# Patient Record
Sex: MALE | Race: WHITE | NOT HISPANIC OR LATINO | URBAN - METROPOLITAN AREA
[De-identification: names, ages, dates, MRNs, and addresses within clinical notes are randomized per-mention and may not be internally consistent; named-entity substitution may affect disease eponyms.]

---

## 2022-08-22 ENCOUNTER — EMERGENCY (EMERGENCY)
Facility: HOSPITAL | Age: 27
LOS: 1 days | Discharge: HOME | End: 2022-08-22
Attending: STUDENT IN AN ORGANIZED HEALTH CARE EDUCATION/TRAINING PROGRAM | Admitting: STUDENT IN AN ORGANIZED HEALTH CARE EDUCATION/TRAINING PROGRAM
Payer: COMMERCIAL

## 2022-08-22 VITALS
TEMPERATURE: 98 F | HEIGHT: 69 IN | HEART RATE: 77 BPM | RESPIRATION RATE: 17 BRPM | OXYGEN SATURATION: 97 % | SYSTOLIC BLOOD PRESSURE: 128 MMHG | WEIGHT: 184.97 LBS | DIASTOLIC BLOOD PRESSURE: 71 MMHG

## 2022-08-22 PROCEDURE — 99284 EMERGENCY DEPT VISIT MOD MDM: CPT | Mod: 25

## 2022-08-22 PROCEDURE — 93971 EXTREMITY STUDY: CPT

## 2022-08-22 PROCEDURE — 99284 EMERGENCY DEPT VISIT MOD MDM: CPT

## 2022-08-22 PROCEDURE — 93971 EXTREMITY STUDY: CPT | Mod: 26,LT

## 2022-08-22 NOTE — ED PROVIDER NOTE - CPE EDP ENMT NORM
Jens Paige discharged to home accompanied by son.   Patient provided with the following educational materials upon discharge:  AVS.   Valuables and belongings sent with patient.   discharge summary, discharge instructions, medications and follow up appointments reviewed with patient and son.  Patient and son verbalized understanding. Patient discharged home in stable condition.    normal...

## 2022-08-22 NOTE — ED ADULT TRIAGE NOTE - CHIEF COMPLAINT QUOTE
pt sent to ED  by ADNRA c/o left arm swelling s/p IV contrast. pt sent to ED  by ANDRA c/o left arm swelling and numbness to the area. pt stated " he had a ct scan with  IV contrast this morning and after his arm was swollen"

## 2022-08-22 NOTE — ED PROVIDER NOTE - PATIENT PORTAL LINK FT
You can access the FollowMyHealth Patient Portal offered by [facility] by registering at the following website: [email or web address]. By joining [employer]’s FollowMyHealth portal, you will also be able to view your health information using other applications (apps) compatible with our system.

## 2022-08-22 NOTE — ED PROVIDER NOTE - ATTENDING APP SHARED VISIT CONTRIBUTION OF CARE
Sent in after contrast extravastion to upper extremity with swelling  seen by vascular, US negative for DVT, compartments remained soft after serial checks  cleared for dc with outpt vascular follow up.

## 2022-08-22 NOTE — ED PROVIDER NOTE - CARE PROVIDER_API CALL
Angela Robertson)  Surgery; Vascular Surgery  [address], [place] [address]  Phone: [phone]  Fax: [phone]  Follow Up Time:

## 2022-08-22 NOTE — ED PROVIDER NOTE - CLINICAL SUMMARY MEDICAL DECISION MAKING FREE TEXT BOX
pt had outpatient ct w/iv contrast this afternoon - infiltrated and now w/swelling to arm, soft compartments, neurovascular intact, however given ue and iv contrast infiltration -- vasc surg consulted - requesting doppler, pt ace wrapped in ed and elevating arm w/decrease in swelling during stay. pt signed out to dr corley pending dispo

## 2022-08-22 NOTE — CONSULT NOTE ADULT - SUBJECTIVE AND OBJECTIVE BOX
Vascular Attending:  Dr. Robertson      HPI: 28yo M no PMH, presents today for left arm swelling after infiltrated IV earlier today. Patient states he underwent an abdominal CT scan today for episode of abdominal pain and blood in stool (results of which per patient were normal), however during the contrast injection while in the scanner he felt a cool sensation in his left arm and it was noted at that time that the IV had infiltrated. His arm became swollen around the AC area as well as down in the forearm region he presented to [facility] from where he was referred to [facility] for an evaluation. He states his arm felt full and he noted the swelling travel from bicep area to forearm. Since having ACE bandage on he now feels some pins and needles sensation in his left hand. Denies weakness, pain or coolness.      PAST MEDICAL & SURGICAL HISTORY: none    MEDICATIONS: none      Allergies  No Known Allergies      Vital Signs Last 24 Hrs  T(C): 36.9 (22 Aug 2022 17:40), Max: 36.9 (22 Aug 2022 17:40)  T(F): 98.5 (22 Aug 2022 17:40), Max: 98.5 (22 Aug 2022 17:40)  HR: 77 (22 Aug 2022 17:40) (77 - 77)  BP: 128/71 (22 Aug 2022 17:40) (128/71 - 128/71)  BP(mean): --  RR: 17 (22 Aug 2022 17:40) (17 - 17)  SpO2: 97% (22 Aug 2022 17:40) (97% - 97%)    Parameters below as of 22 Aug 2022 17:40  Patient On (Oxygen Delivery Method): room air      PHYSICAL EXAM:      Constitutional: alert and awake, NAD    Respiratory: no respiratory ditsress    Cardiovascular: RRR    Extremities: left upper extremity: some edema and fullness around the AC fossa and in upper forearm, compartments soft    Vascular: left hand warm, well perfused, good cap refill <2s, 2+radial and ulnar pulse. Motor/sensory intact.         RADIOLOGY & ADDITIONAL STUDIES    Pending venous duplex Vascular Attending:  Dr. Robertson      HPI: 28yo M no PMH, presents today for left arm swelling after infiltrated IV earlier today. Patient states he underwent an abdominal CT scan today for episode of abdominal pain and blood in stool (results of which per patient were normal), however during the contrast injection while in the scanner he felt a cool sensation in his left arm and it was noted at that time that the IV had infiltrated. His arm became swollen around the AC area as well as down in the forearm region he presented to [facility] from where he was referred to [facility] for an evaluation. He states his arm felt full and he noted the swelling travel from bicep area to forearm. Since having ACE bandage on he now feels some pins and needles sensation in his left hand. Denies weakness, pain or coolness.      PAST MEDICAL & SURGICAL HISTORY: none    MEDICATIONS: none      Allergies  No Known Allergies      Vital Signs Last 24 Hrs  T(C): 36.9 (22 Aug 2022 17:40), Max: 36.9 (22 Aug 2022 17:40)  T(F): 98.5 (22 Aug 2022 17:40), Max: 98.5 (22 Aug 2022 17:40)  HR: 77 (22 Aug 2022 17:40) (77 - 77)  BP: 128/71 (22 Aug 2022 17:40) (128/71 - 128/71)  BP(mean): --  RR: 17 (22 Aug 2022 17:40) (17 - 17)  SpO2: 97% (22 Aug 2022 17:40) (97% - 97%)    Parameters below as of 22 Aug 2022 17:40  Patient On (Oxygen Delivery Method): room air      PHYSICAL EXAM:      Constitutional: alert and awake, NAD    Respiratory: no respiratory ditsress    Cardiovascular: RRR    Extremities: left upper extremity: some edema and fullness around the AC fossa and in upper forearm, compartments soft    Vascular: left hand warm, well perfused, good cap refill <2s, 2+radial and ulnar pulse. Motor/sensory intact.         RADIOLOGY & ADDITIONAL STUDIES    < from: US Duplex Venous Upper Ext Ltd, Left (08.22.22 @ 21:23) >    ******PRELIMINARY REPORT******      ******PRELIMINARY REPORT******       ACC: [id number] EXAM:  US DPLX UPR EXT VEINS LTD LT                          PROCEDURE DATE:  08/22/2022    ******PRELIMINARY REPORT******      ******PRELIMINARY REPORT******           INTERPRETATION:  CLINICAL INFORMATION: Left arm swelling post IV contrast   infiltration    COMPARISON: None available.    TECHNIQUE: Duplex sonography of the LEFT UPPER extremity veins with color   and spectral Doppler, with and without compression.    FINDINGS:    The left internal jugular, subclavian, axillary and brachial veins are   patent and compressible where applicable.  The basilic and cephalic veins   (superficial veins) are patent and without thrombus.    Doppler examination shows normal spontaneous and phasic flow.    There is fluid within the soft tissues of the left upper extremity,   presumably infiltrated IV contrast.      IMPRESSION:  No evidence of left upper extremity deep venous thrombosis.          ******PRELIMINARY REPORT******      ******PRELIMINARY REPORT******       CHANDRAKANT TORRES MD; Resident Radiologist  This document is a PRELIMINARY interpretation and is pending final   attending approval. Aug 22 2022  9:52PM    < end of copied text >

## 2022-08-22 NOTE — ED ADULT NURSE NOTE - CHIEF COMPLAINT QUOTE
pt sent to ED  by ANDRA c/o left arm swelling and numbness to the area. pt stated " he had a ct scan with  IV contrast this morning and after his arm was swollen"

## 2022-08-22 NOTE — ED PROVIDER NOTE - MUSCULOSKELETAL, MLM
Spine appears normal, range of motion is not limited, no muscle or joint tenderness; L UE: + iv site to AC, + min swelling to biceps and min swelling to ventral aspect of mid forearm, all compartments soft, non tender, FROM, no U/M/R nerve deficits, radial pulse 2+, no rash, no lesions, no erythema, no warmth

## 2022-08-22 NOTE — ED PROVIDER NOTE - NSFOLLOWUPINSTRUCTIONS_ED_ALL_ED_FT
P.R.I.C.E. Treatment  WHAT YOU NEED TO KNOW:  P.R.I.C.E. treatment is a 5-step process used to decrease swelling and pain caused by an injury. P.R.I.C.E. stands for protect, rest, ice, compress, and elevate. Start P.R.I.C.E. within 24 to 48 hours of an injury.  DISCHARGE INSTRUCTIONS:  Return to the emergency department if:   •Your pain is severe.  •You have severe swelling or deformity.  •You have numbness in the injured area.  Call your doctor if:   •Your pain and swelling do not go away after a few days.  •You have questions or concerns about your condition or care  How to use P.R.I.C.E. treatment:   R.I.C.E.  •Protect your injury from more damage. Support the injured area with a brace or splint. Your healthcare provider will tell you how long to use the brace or splint.  •Rest your injured area as directed. You may need to stop using, or keep weight off, the injury for 48 hours or longer. Your healthcare provider may recommend crutches or another device. Return to your usual activities as directed.  •Apply ice on your injured area for 15 to 20 minutes every 4 hours or as directed. Use an ice pack, or put crushed ice in a plastic bag. Cover the bag with a towel before you apply it to your skin. Ice helps prevent tissue damage and decreases swelling and pain.  •Compress (keep pressure on) the injured area. Compression will help decrease swelling and support the injured area. Use an elastic bandage, air stirrup, splint, or sling as directed. If you use an elastic bandage, make sure the bandage is not too tight. You should be able to slip 2 fingers between the bandage and your skin.      •Elevate the injured area above the level of your heart as often as you can. This will help decrease swelling and pain. Prop the injured area on pillows or blankets to keep it elevated comfortably. FOLLOW UP WITH DR. THOMAS (VASCULAR SURGERY). ELEVATE THE ARM TO REDUCE SWELLING.      P.R.I.C.E. Treatment  WHAT YOU NEED TO KNOW:  P.R.I.C.E. treatment is a 5-step process used to decrease swelling and pain caused by an injury. P.R.I.C.E. stands for protect, rest, ice, compress, and elevate. Start P.R.I.C.E. within 24 to 48 hours of an injury.  DISCHARGE INSTRUCTIONS:  Return to the emergency department if:   •Your pain is severe.  •You have severe swelling or deformity.  •You have numbness in the injured area.  Call your doctor if:   •Your pain and swelling do not go away after a few days.  •You have questions or concerns about your condition or care  How to use P.R.I.C.E. treatment:   R.I.C.E.  •Protect your injury from more damage. Support the injured area with a brace or splint. Your healthcare provider will tell you how long to use the brace or splint.  •Rest your injured area as directed. You may need to stop using, or keep weight off, the injury for 48 hours or longer. Your healthcare provider may recommend crutches or another device. Return to your usual activities as directed.  •Apply ice on your injured area for 15 to 20 minutes every 4 hours or as directed. Use an ice pack, or put crushed ice in a plastic bag. Cover the bag with a towel before you apply it to your skin. Ice helps prevent tissue damage and decreases swelling and pain.  •Compress (keep pressure on) the injured area. Compression will help decrease swelling and support the injured area. Use an elastic bandage, air stirrup, splint, or sling as directed. If you use an elastic bandage, make sure the bandage is not too tight. You should be able to slip 2 fingers between the bandage and your skin.      •Elevate the injured area above the level of your heart as often as you can. This will help decrease swelling and pain. Prop the injured area on pillows or blankets to keep it elevated comfortably.

## 2022-08-22 NOTE — CONSULT NOTE ADULT - ASSESSMENT
28yo M no PMH, presents today for left arm swelling after infiltrated IV earlier today.    -Recommend venous duplex  -Compression and elevation       Case d/w chief resident on call.  26yo M no PMH, presents today for left arm swelling after infiltrated IV earlier today. Venous duplex negative for DVT. Hand tingling resolved.     -Recommend elevation  -If any questions or concerns please call or follow up at Vascular Surgery office with Dr. Robertson [phone].      Case d/w chief resident on call.

## 2022-08-22 NOTE — ED PROVIDER NOTE - OBJECTIVE STATEMENT
The pt is a 26 y/o M, who presents to ED c/o L arm swelling s/p iv infiltrated while getting an outpatient ct earlier this afternoon. Pt states that initially only the upper arm felt tight and swollen, but now noticed forearm to be getting swollen, some tingling to fingers. Denies numbness, decreased ROM, cp, sob, rash.
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
## 2022-08-26 DIAGNOSIS — R22.32 LOCALIZED SWELLING, MASS AND LUMP, LEFT UPPER LIMB: ICD-10-CM
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
## 2022-08-26 DIAGNOSIS — R20.2 PARESTHESIA OF SKIN: ICD-10-CM

## 2023-07-05 ENCOUNTER — APPOINTMENT (OUTPATIENT)
Dept: FAMILY MEDICINE | Facility: CLINIC | Age: 28
End: 2023-07-05

## 2023-07-15 ENCOUNTER — EMERGENCY (EMERGENCY)
Facility: HOSPITAL | Age: 28
LOS: 1 days | Discharge: HOME | End: 2023-07-15
Admitting: EMERGENCY MEDICINE
Payer: COMMERCIAL

## 2023-07-15 VITALS
DIASTOLIC BLOOD PRESSURE: 56 MMHG | OXYGEN SATURATION: 96 % | RESPIRATION RATE: 16 BRPM | TEMPERATURE: 98 F | SYSTOLIC BLOOD PRESSURE: 107 MMHG | HEART RATE: 63 BPM

## 2023-07-15 VITALS
DIASTOLIC BLOOD PRESSURE: 76 MMHG | TEMPERATURE: 98 F | OXYGEN SATURATION: 97 % | SYSTOLIC BLOOD PRESSURE: 130 MMHG | WEIGHT: 190.04 LBS | HEART RATE: 60 BPM | HEIGHT: 69 IN | RESPIRATION RATE: 18 BRPM

## 2023-07-15 DIAGNOSIS — R10.11 RIGHT UPPER QUADRANT PAIN: ICD-10-CM

## 2023-07-15 DIAGNOSIS — R14.0 ABDOMINAL DISTENSION (GASEOUS): ICD-10-CM

## 2023-07-15 LAB
ALBUMIN SERPL ELPH-MCNC: 4.5 G/DL — SIGNIFICANT CHANGE UP (ref 3.3–5)
ALP SERPL-CCNC: 78 U/L — SIGNIFICANT CHANGE UP (ref 40–120)
ALT FLD-CCNC: 20 U/L — SIGNIFICANT CHANGE UP (ref 10–45)
ANION GAP SERPL CALC-SCNC: 7 MMOL/L — SIGNIFICANT CHANGE UP (ref 5–17)
APPEARANCE UR: CLEAR — SIGNIFICANT CHANGE UP
AST SERPL-CCNC: 17 U/L — SIGNIFICANT CHANGE UP (ref 10–40)
BASOPHILS # BLD AUTO: 0.04 K/UL — SIGNIFICANT CHANGE UP (ref 0–0.2)
BASOPHILS NFR BLD AUTO: 0.9 % — SIGNIFICANT CHANGE UP (ref 0–2)
BILIRUB SERPL-MCNC: 1.1 MG/DL — SIGNIFICANT CHANGE UP (ref 0.2–1.2)
BILIRUB UR-MCNC: NEGATIVE — SIGNIFICANT CHANGE UP
BUN SERPL-MCNC: 16 MG/DL — SIGNIFICANT CHANGE UP (ref 7–23)
CALCIUM SERPL-MCNC: 9.2 MG/DL — SIGNIFICANT CHANGE UP (ref 8.4–10.5)
CHLORIDE SERPL-SCNC: 102 MMOL/L — SIGNIFICANT CHANGE UP (ref 96–108)
CO2 SERPL-SCNC: 28 MMOL/L — SIGNIFICANT CHANGE UP (ref 22–31)
COLOR SPEC: YELLOW — SIGNIFICANT CHANGE UP
CREAT SERPL-MCNC: 1.17 MG/DL — SIGNIFICANT CHANGE UP (ref 0.5–1.3)
DIFF PNL FLD: NEGATIVE — SIGNIFICANT CHANGE UP
EGFR: 88 ML/MIN/1.73M2 — SIGNIFICANT CHANGE UP
EOSINOPHIL # BLD AUTO: 0.05 K/UL — SIGNIFICANT CHANGE UP (ref 0–0.5)
EOSINOPHIL NFR BLD AUTO: 1.1 % — SIGNIFICANT CHANGE UP (ref 0–6)
GLUCOSE SERPL-MCNC: 87 MG/DL — SIGNIFICANT CHANGE UP (ref 70–99)
GLUCOSE UR QL: NEGATIVE — SIGNIFICANT CHANGE UP
HCT VFR BLD CALC: 48.4 % — SIGNIFICANT CHANGE UP (ref 39–50)
HGB BLD-MCNC: 16.2 G/DL — SIGNIFICANT CHANGE UP (ref 13–17)
IMM GRANULOCYTES NFR BLD AUTO: 0.2 % — SIGNIFICANT CHANGE UP (ref 0–0.9)
KETONES UR-MCNC: NEGATIVE — SIGNIFICANT CHANGE UP
LEUKOCYTE ESTERASE UR-ACNC: NEGATIVE — SIGNIFICANT CHANGE UP
LYMPHOCYTES # BLD AUTO: 1.28 K/UL — SIGNIFICANT CHANGE UP (ref 1–3.3)
LYMPHOCYTES # BLD AUTO: 28.8 % — SIGNIFICANT CHANGE UP (ref 13–44)
MCHC RBC-ENTMCNC: 30.3 PG — SIGNIFICANT CHANGE UP (ref 27–34)
MCHC RBC-ENTMCNC: 33.5 GM/DL — SIGNIFICANT CHANGE UP (ref 32–36)
MCV RBC AUTO: 90.5 FL — SIGNIFICANT CHANGE UP (ref 80–100)
MONOCYTES # BLD AUTO: 0.35 K/UL — SIGNIFICANT CHANGE UP (ref 0–0.9)
MONOCYTES NFR BLD AUTO: 7.9 % — SIGNIFICANT CHANGE UP (ref 2–14)
NEUTROPHILS # BLD AUTO: 2.72 K/UL — SIGNIFICANT CHANGE UP (ref 1.8–7.4)
NEUTROPHILS NFR BLD AUTO: 61.1 % — SIGNIFICANT CHANGE UP (ref 43–77)
NITRITE UR-MCNC: NEGATIVE — SIGNIFICANT CHANGE UP
NRBC # BLD: 0 /100 WBCS — SIGNIFICANT CHANGE UP (ref 0–0)
PH UR: 7.5 — SIGNIFICANT CHANGE UP (ref 5–8)
PLATELET # BLD AUTO: 245 K/UL — SIGNIFICANT CHANGE UP (ref 150–400)
POTASSIUM SERPL-MCNC: 4.2 MMOL/L — SIGNIFICANT CHANGE UP (ref 3.5–5.3)
POTASSIUM SERPL-SCNC: 4.2 MMOL/L — SIGNIFICANT CHANGE UP (ref 3.5–5.3)
PROT SERPL-MCNC: 7 G/DL — SIGNIFICANT CHANGE UP (ref 6–8.3)
PROT UR-MCNC: NEGATIVE MG/DL — SIGNIFICANT CHANGE UP
RBC # BLD: 5.35 M/UL — SIGNIFICANT CHANGE UP (ref 4.2–5.8)
RBC # FLD: 11.7 % — SIGNIFICANT CHANGE UP (ref 10.3–14.5)
SODIUM SERPL-SCNC: 137 MMOL/L — SIGNIFICANT CHANGE UP (ref 135–145)
SP GR SPEC: 1.01 — SIGNIFICANT CHANGE UP (ref 1–1.03)
UROBILINOGEN FLD QL: 1 E.U./DL — SIGNIFICANT CHANGE UP
WBC # BLD: 4.45 K/UL — SIGNIFICANT CHANGE UP (ref 3.8–10.5)
WBC # FLD AUTO: 4.45 K/UL — SIGNIFICANT CHANGE UP (ref 3.8–10.5)

## 2023-07-15 PROCEDURE — 36415 COLL VENOUS BLD VENIPUNCTURE: CPT

## 2023-07-15 PROCEDURE — 99284 EMERGENCY DEPT VISIT MOD MDM: CPT | Mod: 25

## 2023-07-15 PROCEDURE — 96374 THER/PROPH/DIAG INJ IV PUSH: CPT

## 2023-07-15 PROCEDURE — 81003 URINALYSIS AUTO W/O SCOPE: CPT

## 2023-07-15 PROCEDURE — 99284 EMERGENCY DEPT VISIT MOD MDM: CPT

## 2023-07-15 PROCEDURE — 83690 ASSAY OF LIPASE: CPT

## 2023-07-15 PROCEDURE — 76705 ECHO EXAM OF ABDOMEN: CPT

## 2023-07-15 PROCEDURE — 80053 COMPREHEN METABOLIC PANEL: CPT

## 2023-07-15 PROCEDURE — 76705 ECHO EXAM OF ABDOMEN: CPT | Mod: 26

## 2023-07-15 PROCEDURE — 85025 COMPLETE CBC W/AUTO DIFF WBC: CPT

## 2023-07-15 RX ORDER — FAMOTIDINE 10 MG/ML
20 INJECTION INTRAVENOUS ONCE
Refills: 0 | Status: COMPLETED | OUTPATIENT
Start: 2023-07-15 | End: 2023-07-15

## 2023-07-15 RX ORDER — SODIUM CHLORIDE 9 MG/ML
1000 INJECTION INTRAMUSCULAR; INTRAVENOUS; SUBCUTANEOUS ONCE
Refills: 0 | Status: COMPLETED | OUTPATIENT
Start: 2023-07-15 | End: 2023-07-15

## 2023-07-15 RX ORDER — LIDOCAINE 4 G/100G
10 CREAM TOPICAL ONCE
Refills: 0 | Status: COMPLETED | OUTPATIENT
Start: 2023-07-15 | End: 2023-07-15

## 2023-07-15 RX ADMIN — LIDOCAINE 10 MILLILITER(S): 4 CREAM TOPICAL at 12:19

## 2023-07-15 RX ADMIN — FAMOTIDINE 20 MILLIGRAM(S): 10 INJECTION INTRAVENOUS at 12:19

## 2023-07-15 RX ADMIN — SODIUM CHLORIDE 1000 MILLILITER(S): 9 INJECTION INTRAMUSCULAR; INTRAVENOUS; SUBCUTANEOUS at 12:19

## 2023-07-15 RX ADMIN — Medication 30 MILLILITER(S): at 12:19

## 2023-07-15 NOTE — ED PROVIDER NOTE - PATIENT PORTAL LINK FT
You can access the FollowMyHealth Patient Portal offered by [facility] by registering at the following website: [email or web address]. By joining [email or web address]’s FollowMyHealth portal, you will also be able to view your health information using other applications (apps) compatible with our system.

## 2023-07-15 NOTE — ED PROVIDER NOTE - PHYSICAL EXAMINATION
Vitals reviewed  Gen: well appearing, nad, speaking in full sentences  Skin: wwp, no rash/lesions  HEENT: ncat, eomi, mmm  CV: rrr, no audible m/r/g  Resp: symmetrical expansion, ctab, no w/r/r  Abd: nondistended, soft, mild RUQ ttp, no r/g, neg murphys, no cvat   Ext: FROM throughout, no peripheral edema  Neuro: alert/oriented, no focal deficits, steady gait

## 2023-07-15 NOTE — ED ADULT NURSE NOTE - NSFALLUNIVINTERV_ED_ALL_ED
Bed/Stretcher in lowest position, wheels locked, appropriate side rails in place/Call bell, personal items and telephone in reach/Instruct patient to call for assistance before getting out of bed/chair/stretcher/Non-slip footwear applied when patient is off stretcher/[place] to call system/Physically safe environment - no spills, clutter or unnecessary equipment/Purposeful proactive rounding/Room/bathroom lighting operational, light cord in reach

## 2023-07-15 NOTE — ED PROVIDER NOTE - CLINICAL SUMMARY MEDICAL DECISION MAKING FREE TEXT BOX
28 M denies pmh p/w RUQ abd pain x 12 days.  occasionally worse after eating.  no relief w/ gas med rx by pmd.  on exam vss, comfortable appearing, lungs ctab, hrrr, abd soft, mild RUQ ttp, no r/g, neg murphys, no cvat.  ?gastritis vs biliary colic vs pancreatitis.  will check labs, sonogram and give sxs treatment 28 M denies pmh p/w RUQ abd pain x 12 days.  occasionally worse after eating.  no relief w/ gas med rx by pmd.  on exam vss, comfortable appearing, lungs ctab, hrrr, abd soft, mild RUQ ttp, no r/g, neg murphys, no cvat.  ?gastritis/PUD vs biliary colic vs pancreatitis.  will check labs, sonogram and give sxs treatment    labs/urine and sonogram all unremarkable

## 2023-07-15 NOTE — ED PROVIDER NOTE - NSFOLLOWUPINSTRUCTIONS_ED_ALL_ED_FT
You should continue your medication as prescribed by primary care doctor    You can also take over the counter antacid medication such as zantac to help alleviate pain     Please call to arrange follow up with primary care doctor within one week    Can call [phone] to schedule appointment.    Gastritis    Gastritis is soreness and swelling (inflammation) of the lining of the stomach. Gastritis can develop as a sudden onset (acute) or long-term (chronic) condition. If gastritis is not treated, it can lead to stomach bleeding and ulcers. Causes include viral and bacterial infections, excessive alcohol consumption, tobacco use, or certain medications. Symptoms include nausea, vomiting, or abdominal pain or burning especially after eating. Avoid foods or drinks that make your symptoms worse such as caffeine, chocolate, spicy foods, acidic foods, or alcohol.    SEEK IMMEDIATE MEDICAL CARE IF YOU HAVE ANY OF THE FOLLOWING SYMPTOMS: black or bloody stools, blood or coffee-ground-colored vomitus, worsening abdominal pain, fever, or inability to keep fluids down.

## 2023-07-15 NOTE — ED ADULT TRIAGE NOTE - CHIEF COMPLAINT QUOTE
pt to ED c/o 12 days of Right upper abd discomfort radiating to Right flank. "Almost like bloating" Denies N/V/D.

## 2023-07-15 NOTE — ED ADULT NURSE NOTE - NSSEPSISNEWALTERMENTAL_ED_A_ED
Hira Pillai MD Yang, Allison, RN 4 hours ago (8:43 AM)       Agreed, no imaging necessary right now         No

## 2023-07-15 NOTE — ED ADULT NURSE NOTE - OBJECTIVE STATEMENT
28y male presents to ED c/o upper abdominal pain x 12 days. Describes discomfort as a bloating sensation. Denies n/v, pmh. A&Ox4.

## 2023-07-15 NOTE — ED PROVIDER NOTE - OBJECTIVE STATEMENT
28 M denies pmh p/w RUQ abd pain x 12 days.  pt reports constant dull discomfort in RUQ, occasionally worse after eating and sitting up straight.  + bloating.  saw pcp and was prescribed gas relief medication but no relief of sxs so told to come to ED.  denies f/c, HA, dizziness, fainting, uri sxs, cough, chest pain, sob, vd, melena/hematochezia, urinary sxs, sick contacts, trauma

## 2024-01-29 ENCOUNTER — EMERGENCY (EMERGENCY)
Facility: HOSPITAL | Age: 29
LOS: 1 days | Discharge: HOME | End: 2024-01-29
Attending: EMERGENCY MEDICINE | Admitting: EMERGENCY MEDICINE
Payer: COMMERCIAL

## 2024-01-29 VITALS
HEART RATE: 72 BPM | OXYGEN SATURATION: 97 % | TEMPERATURE: 98 F | RESPIRATION RATE: 18 BRPM | DIASTOLIC BLOOD PRESSURE: 78 MMHG | SYSTOLIC BLOOD PRESSURE: 116 MMHG

## 2024-01-29 VITALS
RESPIRATION RATE: 18 BRPM | OXYGEN SATURATION: 98 % | HEART RATE: 79 BPM | WEIGHT: 184.97 LBS | TEMPERATURE: 98 F | HEIGHT: 69 IN | DIASTOLIC BLOOD PRESSURE: 82 MMHG | SYSTOLIC BLOOD PRESSURE: 131 MMHG

## 2024-01-29 DIAGNOSIS — I86.1 SCROTAL VARICES: ICD-10-CM

## 2024-01-29 DIAGNOSIS — N50.811 RIGHT TESTICULAR PAIN: ICD-10-CM

## 2024-01-29 DIAGNOSIS — N50.82 SCROTAL PAIN: ICD-10-CM

## 2024-01-29 PROBLEM — Z78.9 OTHER SPECIFIED HEALTH STATUS: Chronic | Status: ACTIVE | Noted: 2023-07-15

## 2024-01-29 LAB
APPEARANCE UR: CLEAR — SIGNIFICANT CHANGE UP
BILIRUB UR-MCNC: NEGATIVE — SIGNIFICANT CHANGE UP
COLOR SPEC: YELLOW — SIGNIFICANT CHANGE UP
DIFF PNL FLD: NEGATIVE — SIGNIFICANT CHANGE UP
GLUCOSE UR QL: NEGATIVE MG/DL — SIGNIFICANT CHANGE UP
KETONES UR-MCNC: NEGATIVE MG/DL — SIGNIFICANT CHANGE UP
LEUKOCYTE ESTERASE UR-ACNC: NEGATIVE — SIGNIFICANT CHANGE UP
NITRITE UR-MCNC: NEGATIVE — SIGNIFICANT CHANGE UP
PH UR: 6.5 — SIGNIFICANT CHANGE UP (ref 5–8)
PROT UR-MCNC: NEGATIVE MG/DL — SIGNIFICANT CHANGE UP
SP GR SPEC: 1.02 — SIGNIFICANT CHANGE UP (ref 1–1.03)
UROBILINOGEN FLD QL: 1 MG/DL — SIGNIFICANT CHANGE UP (ref 0.2–1)

## 2024-01-29 PROCEDURE — 99284 EMERGENCY DEPT VISIT MOD MDM: CPT

## 2024-01-29 PROCEDURE — 87086 URINE CULTURE/COLONY COUNT: CPT

## 2024-01-29 PROCEDURE — 99284 EMERGENCY DEPT VISIT MOD MDM: CPT | Mod: 25

## 2024-01-29 PROCEDURE — 81003 URINALYSIS AUTO W/O SCOPE: CPT

## 2024-01-29 PROCEDURE — 76870 US EXAM SCROTUM: CPT

## 2024-01-29 PROCEDURE — 76870 US EXAM SCROTUM: CPT | Mod: 26

## 2024-01-29 NOTE — ED PROVIDER NOTE - PHYSICAL EXAMINATION
ISA morejon chaperone: no testicular ttp/swelling. no inguinal hernias or MSK ttp. No crepitus, firmness, induration, fluctuance. Skin is normal temp. No erythema/warmth. No obvious skin breaks.   no LE edema, normal equal distal pulses, steady unassisted gait.    Physical Exam:    CONSTITUTIONAL:  Generally well appearing, no acute distress, alert, awake and oriented  HEENT:  Moist mucous membranes, normal voice  PULM:  No accessory muscle use, speaking full sentences  SKIN:  Normal in appearance, normal color

## 2024-01-29 NOTE — ED PROVIDER NOTE - NSFOLLOWUPINSTRUCTIONS_ED_ALL_ED_FT
Follow up with urologist for your "varicocele."    Can take tylenol every 6hrs as needed for pain.    Can also take motrin 600mg every 6hrs as needed for pain (WARNING: Use may cause stomach issues/problems. Take with food. Prolonged use can also cause kidney issues.).     Return for fevers, spreading redness, worsening swelling, persistent vomit, uncontrolled pain, worsening breathing, worsening lightheaded, unable to urinate.    Follow up with primary doctor within 1-2 days.     Follow up with urologist. Can call [phone] to schedule appointment. Can also follow up at [facility] urology clinic. Can call [phone] to schedule appointment.     Varicocele    WHAT YOU NEED TO KNOW:    A varicocele is a condition that causes the veins in your scrotum to become enlarged and dilated. The scrotum is the sac that holds the testicles. A varicocele can cause infertility because it prevents sperm from flowing out of the testicles.    DISCHARGE INSTRUCTIONS:    Medicines:   •NSAIDs, such as ibuprofen, help decrease swelling, pain, and fever. This medicine is available with or without a doctor's order. NSAIDs can cause stomach bleeding or kidney problems in certain people. If you take blood thinner medicine, always ask your healthcare provider if NSAIDs are safe for you. Always read the medicine label and follow directions.    •Take your medicine as directed. Contact your healthcare provider if you think your medicine is not helping or if you have side effects. Tell him or her if you are allergic to any medicine. Keep a list of the medicines, vitamins, and herbs you take. Include the amounts, and when and why you take them. Bring the list or the pill bottles to follow-up visits. Carry your medicine list with you in case of an emergency.    Wear an athletic supporter: An athletic supporter may reduce pressure and treat your varicocele.    Follow up with your healthcare provider as directed: Write down your questions so you remember to ask them during your visits.     Contact your healthcare provider if:   •You have pain in your scrotum.  •You have a lump on your scrotum.  •You have questions or concerns about your condition or care.

## 2024-01-29 NOTE — ED PROVIDER NOTE - PROGRESS NOTE DETAILS
Klepfish: UA no infection. US showed "1. No signs of testicular torsion or epididymoorchitis, particularly on the right. 2. Left larger than right bilateral varicoceles." Pt remains very well appearing.   Discussed importance of outpt follow up and return precautions. Clinically no indication for further emergent ED workup or hospitalization at this time. Stable for dc, outpt f/u.

## 2024-01-29 NOTE — ED PROVIDER NOTE - CLINICAL SUMMARY MEDICAL DECISION MAKING FREE TEXT BOX
28M denies PMH p/w pain/fullness. Has 2-3d of feeling discomfort to R scrotal/testicular region and R proximal medial thigh region. States that upon palpation the R scrotum/testicle feels petersen than the L. No other systemic symptoms.   Vitals wnl, exam as above.   ddx: Possible muscle strain vs. small varicocele/hydrocele. Clinically not torsion and low suspicion epididymitis.   US, UA, likely outpt f/u.  Does not want any pain meds.

## 2024-01-29 NOTE — ED ADULT TRIAGE NOTE - CHIEF COMPLAINT QUOTE
Patient to the ED c/o groin pain radiating to right testicle x 3 days, denies changes in urine. Reports recent groin injury while playing soccer. AAKRISTEN, NAD.

## 2024-01-29 NOTE — ED PROVIDER NOTE - PATIENT PORTAL LINK FT
You can access the FollowMyHealth Patient Portal offered by [facility] by registering at the following website: [email or web address]. By joining [employer]’s FollowMyHealth portal, you will also be able to view your health information using other applications (apps) compatible with our system.

## 2024-01-29 NOTE — ED PROVIDER NOTE - OBJECTIVE STATEMENT
28M denies PMH p/w pain/fullness. Has 2-3d of feeling discomfort to R scrotal/testicular region and R proximal medial thigh region. States that upon palpation the R scrotum/testicle feels petersen than the L. No other systemic symptoms.   ~2-3 months ago pt had sudden (but different) pain to same region that began while playing soccer, has since improved.   Never sexually active.   Denies abd pain, f/c, NVD, urinary complaints, focal weakness/numbness, penile pain/discharge.

## 2024-01-29 NOTE — ED ADULT NURSE NOTE - OBJECTIVE STATEMENT
27 yo M c/o groin pain. Pt describes it as in the inner thigh going to his R scrotum. Pt reports it feels abnormal when he has examined himself. Denies any burning or discharge at urination, any sexual activity, abdominal pain, and NVD. Alert and oriented, ambulatory independently.

## 2024-01-30 LAB
CULTURE RESULTS: NO GROWTH — SIGNIFICANT CHANGE UP
SPECIMEN SOURCE: SIGNIFICANT CHANGE UP

## 2024-02-02 ENCOUNTER — APPOINTMENT (OUTPATIENT)
Dept: UROLOGY | Facility: CLINIC | Age: 29
End: 2024-02-02
Payer: COMMERCIAL

## 2024-02-02 ENCOUNTER — NON-APPOINTMENT (OUTPATIENT)
Age: 29
End: 2024-02-02

## 2024-02-02 VITALS
HEART RATE: 72 BPM | BODY MASS INDEX: 27.28 KG/M2 | DIASTOLIC BLOOD PRESSURE: 76 MMHG | WEIGHT: 180 LBS | HEIGHT: 68 IN | SYSTOLIC BLOOD PRESSURE: 130 MMHG | TEMPERATURE: 98.2 F

## 2024-02-02 DIAGNOSIS — I86.1 SCROTAL VARICES: ICD-10-CM

## 2024-02-02 PROCEDURE — 99203 OFFICE O/P NEW LOW 30 MIN: CPT

## 2024-02-02 NOTE — ASSESSMENT
[FreeTextEntry1] : 27yo M B/L varicoceles -pain unlikely due to varicoceles, possible CPPS -warm sitz baths -OTC pain meds -F/U 2 months and reconsider if necessary. Consider SA at that time. -UA, Ucx

## 2024-02-02 NOTE — HISTORY OF PRESENT ILLNESS
[FreeTextEntry1] : CHIP SWENSON JR is a 28 year M presenting on 02/02/2024 PMH: L varicocelectomy 2015  Went to ER Monday (4 days ago).  Pulled something in inner thigh playing soccer between scrotum and thigh. 3.5 months ago. Right side. Pain went away then came back over past week as more discomfort focused on scrotum. Described as heavy, pulling 3/10 discomfort. Comes and goes, worse with prolonged sitting. Never felt any discomfort on L side. Went to ER to get checked out. Found B/L varicoceles. Taking tylenol for pain relief.   Labs: 1/29/24 scrotal US [facility] -R testis: 14mL, supine w valsalva 3mm varicocele -L testis: 13mL, supine w valsalva 4.5mm varicocele

## 2024-02-02 NOTE — PHYSICAL EXAM
[Normal Appearance] : normal appearance [Well Groomed] : well groomed [General Appearance - In No Acute Distress] : no acute distress [Respiration, Rhythm And Depth] : normal respiratory rhythm and effort [Exaggerated Use Of Accessory Muscles For Inspiration] : no accessory muscle use [Urethral Meatus] : meatus normal [Penis Abnormality] : normal circumcised penis [Scrotum] : the scrotum was normal [Testes Tenderness] : no tenderness of the testes [Testes Mass (___cm)] : there were no testicular masses [Normal Station and Gait] : the gait and station were normal for the patient's age [] : no rash [No Focal Deficits] : no focal deficits [Oriented To Time, Place, And Person] : oriented to person, place, and time [Affect] : the affect was normal [Mood] : the mood was normal [de-identified] : B/L 12cc testis, grade 1 L varicocele, subtle varicocele on R

## 2024-02-05 LAB
APPEARANCE: CLEAR
BACTERIA UR CULT: NORMAL
BILIRUBIN URINE: NEGATIVE
BLOOD URINE: NEGATIVE
COLOR: YELLOW
GLUCOSE QUALITATIVE U: NEGATIVE MG/DL
KETONES URINE: ABNORMAL MG/DL
LEUKOCYTE ESTERASE URINE: NEGATIVE
NITRITE URINE: NEGATIVE
PH URINE: 6
PROTEIN URINE: NEGATIVE MG/DL
SPECIFIC GRAVITY URINE: >1.03
UROBILINOGEN URINE: 0.2 MG/DL

## 2024-02-09 ENCOUNTER — APPOINTMENT (OUTPATIENT)
Dept: UROLOGY | Facility: CLINIC | Age: 29
End: 2024-02-09

## 2024-04-05 ENCOUNTER — APPOINTMENT (OUTPATIENT)
Dept: UROLOGY | Facility: CLINIC | Age: 29
End: 2024-04-05
Payer: COMMERCIAL

## 2024-04-05 VITALS
TEMPERATURE: 95.6 F | WEIGHT: 180 LBS | DIASTOLIC BLOOD PRESSURE: 76 MMHG | HEART RATE: 96 BPM | SYSTOLIC BLOOD PRESSURE: 132 MMHG | HEIGHT: 68 IN | BODY MASS INDEX: 27.28 KG/M2

## 2024-04-05 PROCEDURE — 99214 OFFICE O/P EST MOD 30 MIN: CPT

## 2024-04-05 NOTE — ASSESSMENT
[FreeTextEntry1] : right testis pain likely cpps recurrent varicocele on sono - not noted on my exam for SA sitz baths if no improvement alfuzosin by phone f/u 3months

## 2024-04-05 NOTE — HISTORY OF PRESENT ILLNESS
[FreeTextEntry1] : returns for lollwup testicular pain is better overall  was doing baths intermittently pain primarily on right side UA negative UCX negiatve generally imporved recurrent varicocele noted on sonogram

## 2024-07-03 ENCOUNTER — APPOINTMENT (OUTPATIENT)
Dept: UROLOGY | Facility: CLINIC | Age: 29
End: 2024-07-03

## 2024-11-25 ENCOUNTER — APPOINTMENT (OUTPATIENT)
Age: 29
End: 2024-11-25
Payer: MEDICAID

## 2024-11-25 VITALS
SYSTOLIC BLOOD PRESSURE: 120 MMHG | OXYGEN SATURATION: 96 % | HEART RATE: 71 BPM | TEMPERATURE: 97.1 F | BODY MASS INDEX: 27.74 KG/M2 | HEIGHT: 68 IN | RESPIRATION RATE: 14 BRPM | WEIGHT: 183 LBS | DIASTOLIC BLOOD PRESSURE: 55 MMHG

## 2024-11-25 DIAGNOSIS — Z00.00 ENCOUNTER FOR GENERAL ADULT MEDICAL EXAMINATION W/OUT ABNORMAL FINDINGS: ICD-10-CM

## 2024-11-25 DIAGNOSIS — Z80.3 FAMILY HISTORY OF MALIGNANT NEOPLASM OF BREAST: ICD-10-CM

## 2024-11-25 DIAGNOSIS — N50.811 RIGHT TESTICULAR PAIN: ICD-10-CM

## 2024-11-25 PROCEDURE — 99385 PREV VISIT NEW AGE 18-39: CPT

## 2024-11-27 LAB
ALBUMIN SERPL ELPH-MCNC: 4.7 G/DL
ALP BLD-CCNC: 88 U/L
ALT SERPL-CCNC: 20 U/L
ANION GAP SERPL CALC-SCNC: 15 MMOL/L
AST SERPL-CCNC: 18 U/L
BASOPHILS # BLD AUTO: 0.04 K/UL
BASOPHILS NFR BLD AUTO: 0.6 %
BILIRUB SERPL-MCNC: 0.9 MG/DL
BUN SERPL-MCNC: 22 MG/DL
CALCIUM SERPL-MCNC: 9.4 MG/DL
CHLORIDE SERPL-SCNC: 102 MMOL/L
CHOLEST SERPL-MCNC: 149 MG/DL
CO2 SERPL-SCNC: 26 MMOL/L
CREAT SERPL-MCNC: 1.3 MG/DL
EGFR: 77 ML/MIN/1.73M2
EOSINOPHIL # BLD AUTO: 0.07 K/UL
EOSINOPHIL NFR BLD AUTO: 1.1 %
ESTIMATED AVERAGE GLUCOSE: 91 MG/DL
GLUCOSE SERPL-MCNC: 83 MG/DL
HBA1C MFR BLD HPLC: 4.8 %
HCT VFR BLD CALC: 47.2 %
HCV AB SER QL: NONREACTIVE
HCV S/CO RATIO: 0.16 S/CO
HDLC SERPL-MCNC: 51 MG/DL
HGB BLD-MCNC: 15.7 G/DL
HIV1+2 AB SPEC QL IA.RAPID: NONREACTIVE
IMM GRANULOCYTES NFR BLD AUTO: 0.3 %
LDLC SERPL CALC-MCNC: 84 MG/DL
LYMPHOCYTES # BLD AUTO: 1.33 K/UL
LYMPHOCYTES NFR BLD AUTO: 20.2 %
MAN DIFF?: NORMAL
MCHC RBC-ENTMCNC: 30.2 PG
MCHC RBC-ENTMCNC: 33.3 G/DL
MCV RBC AUTO: 90.8 FL
MONOCYTES # BLD AUTO: 0.48 K/UL
MONOCYTES NFR BLD AUTO: 7.3 %
NEUTROPHILS # BLD AUTO: 4.65 K/UL
NEUTROPHILS NFR BLD AUTO: 70.5 %
NONHDLC SERPL-MCNC: 98 MG/DL
PLATELET # BLD AUTO: 279 K/UL
POTASSIUM SERPL-SCNC: 4.1 MMOL/L
PROT SERPL-MCNC: 7.1 G/DL
RBC # BLD: 5.2 M/UL
RBC # FLD: 12.1 %
SODIUM SERPL-SCNC: 142 MMOL/L
TRIGL SERPL-MCNC: 74 MG/DL
TSH SERPL-ACNC: 0.8 UIU/ML
WBC # FLD AUTO: 6.59 K/UL

## 2024-12-04 ENCOUNTER — TRANSCRIPTION ENCOUNTER (OUTPATIENT)
Age: 29
End: 2024-12-04

## 2024-12-05 LAB
APPEARANCE: ABNORMAL
BACTERIA: NEGATIVE /HPF
BILIRUBIN URINE: NEGATIVE
BLOOD URINE: NEGATIVE
CAST: 0 /LPF
COLOR: NORMAL
EPITHELIAL CELLS: 0 /HPF
GLUCOSE QUALITATIVE U: NEGATIVE MG/DL
KETONES URINE: NEGATIVE MG/DL
LEUKOCYTE ESTERASE URINE: NEGATIVE
MICROSCOPIC-UA: NORMAL
NITRITE URINE: NEGATIVE
PH URINE: 6
PROTEIN URINE: NORMAL MG/DL
RED BLOOD CELLS URINE: 1 /HPF
SPECIFIC GRAVITY URINE: >1.03
UROBILINOGEN URINE: 1 MG/DL
WHITE BLOOD CELLS URINE: 0 /HPF

## 2025-04-09 ENCOUNTER — NON-APPOINTMENT (OUTPATIENT)
Age: 30
End: 2025-04-09

## 2025-04-09 ENCOUNTER — APPOINTMENT (OUTPATIENT)
Dept: DERMATOLOGY | Facility: CLINIC | Age: 30
End: 2025-04-09
Payer: MEDICAID

## 2025-04-09 DIAGNOSIS — B36.0 PITYRIASIS VERSICOLOR: ICD-10-CM

## 2025-04-09 DIAGNOSIS — D22.9 MELANOCYTIC NEVI, UNSPECIFIED: ICD-10-CM

## 2025-04-09 DIAGNOSIS — Z12.83 ENCOUNTER FOR SCREENING FOR MALIGNANT NEOPLASM OF SKIN: ICD-10-CM

## 2025-04-09 DIAGNOSIS — L20.9 ATOPIC DERMATITIS, UNSPECIFIED: ICD-10-CM

## 2025-04-09 DIAGNOSIS — D48.9 NEOPLASM OF UNCERTAIN BEHAVIOR, UNSPECIFIED: ICD-10-CM

## 2025-04-09 PROCEDURE — 99204 OFFICE O/P NEW MOD 45 MIN: CPT | Mod: 25

## 2025-04-09 PROCEDURE — 11104 PUNCH BX SKIN SINGLE LESION: CPT

## 2025-04-09 RX ORDER — FLUCONAZOLE 150 MG/1
150 TABLET ORAL
Qty: 2 | Refills: 0 | Status: ACTIVE | COMMUNITY
Start: 2025-04-09 | End: 1900-01-01

## 2025-04-09 RX ORDER — RUXOLITINIB 15 MG/G
1.5 CREAM TOPICAL
Qty: 1 | Refills: 11 | Status: ACTIVE | COMMUNITY
Start: 2025-04-09 | End: 1900-01-01

## 2025-04-09 RX ORDER — KETOCONAZOLE 20 MG/ML
2 SHAMPOO TOPICAL
Qty: 1 | Refills: 11 | Status: ACTIVE | COMMUNITY
Start: 2025-04-09 | End: 1900-01-01

## 2025-04-10 PROBLEM — Z12.83 SKIN CANCER SCREENING: Status: ACTIVE | Noted: 2025-04-10

## 2025-04-10 PROBLEM — D22.9 MULTIPLE NEVI: Status: ACTIVE | Noted: 2025-04-10

## 2025-04-15 LAB — DERMATOLOGY BIOPSY: NORMAL
